# Patient Record
Sex: FEMALE | Race: WHITE | NOT HISPANIC OR LATINO | Employment: FULL TIME | URBAN - METROPOLITAN AREA
[De-identification: names, ages, dates, MRNs, and addresses within clinical notes are randomized per-mention and may not be internally consistent; named-entity substitution may affect disease eponyms.]

---

## 2017-05-17 ENCOUNTER — ALLSCRIPTS OFFICE VISIT (OUTPATIENT)
Dept: OTHER | Facility: OTHER | Age: 57
End: 2017-05-17

## 2017-06-12 ENCOUNTER — GENERIC CONVERSION - ENCOUNTER (OUTPATIENT)
Dept: OTHER | Facility: OTHER | Age: 57
End: 2017-06-12

## 2017-06-12 LAB
BASOPHILS # BLD AUTO: 0 X10E3/UL (ref 0–0.2)
BASOPHILS # BLD AUTO: 1 %
DEPRECATED RDW RBC AUTO: 13.8 % (ref 12.3–15.4)
EOSINOPHIL # BLD AUTO: 0.3 X10E3/UL (ref 0–0.4)
EOSINOPHIL # BLD AUTO: 6 %
HCT VFR BLD AUTO: 41.7 % (ref 34–46.6)
HGB BLD-MCNC: 14.1 G/DL (ref 11.1–15.9)
IMM.GRANULOCYTES (CD4/8) (HISTORICAL): 0 %
IMM.GRANULOCYTES (CD4/8) (HISTORICAL): 0 X10E3/UL (ref 0–0.1)
LYMPHOCYTES # BLD AUTO: 2 X10E3/UL (ref 0.7–3.1)
LYMPHOCYTES # BLD AUTO: 37 %
MCH RBC QN AUTO: 29.8 PG (ref 26.6–33)
MCHC RBC AUTO-ENTMCNC: 33.8 G/DL (ref 31.5–35.7)
MCV RBC AUTO: 88 FL (ref 79–97)
MONOCYTES # BLD AUTO: 0.5 X10E3/UL (ref 0.1–0.9)
MONOCYTES (HISTORICAL): 8 %
NEUTROPHILS # BLD AUTO: 2.7 X10E3/UL (ref 1.4–7)
NEUTROPHILS # BLD AUTO: 48 %
PLATELET # BLD AUTO: 340 X10E3/UL (ref 150–379)
RBC (HISTORICAL): 4.73 X10E6/UL (ref 3.77–5.28)
WBC # BLD AUTO: 5.5 X10E3/UL (ref 3.4–10.8)

## 2017-06-13 ENCOUNTER — GENERIC CONVERSION - ENCOUNTER (OUTPATIENT)
Dept: OTHER | Facility: OTHER | Age: 57
End: 2017-06-13

## 2017-06-13 LAB
A/G RATIO (HISTORICAL): 1.8 (ref 1.2–2.2)
ALBUMIN SERPL BCP-MCNC: 4.6 G/DL (ref 3.5–5.5)
ALP SERPL-CCNC: 109 IU/L (ref 39–117)
ALT SERPL W P-5'-P-CCNC: 14 IU/L (ref 0–32)
AST SERPL W P-5'-P-CCNC: 22 IU/L (ref 0–40)
BILIRUB SERPL-MCNC: <0.2 MG/DL (ref 0–1.2)
BUN SERPL-MCNC: 12 MG/DL (ref 6–24)
BUN/CREA RATIO (HISTORICAL): 17 (ref 9–23)
CALCIUM SERPL-MCNC: 9.3 MG/DL (ref 8.7–10.2)
CHLORIDE SERPL-SCNC: 102 MMOL/L (ref 96–106)
CHOLEST SERPL-MCNC: 224 MG/DL (ref 100–199)
CO2 SERPL-SCNC: 22 MMOL/L (ref 18–29)
CREAT SERPL-MCNC: 0.72 MG/DL (ref 0.57–1)
EGFR AFRICAN AMERICAN (HISTORICAL): 109 ML/MIN/1.73
EGFR-AMERICAN CALC (HISTORICAL): 95 ML/MIN/1.73
GLUCOSE SERPL-MCNC: 102 MG/DL (ref 65–99)
HDLC SERPL-MCNC: 95 MG/DL
INTERPRETATION (HISTORICAL): NORMAL
LDLC SERPL CALC-MCNC: 120 MG/DL (ref 0–99)
LYME IGG/IGM AB (HISTORICAL): <0.91 ISR (ref 0–0.9)
LYME IGM (HISTORICAL): <0.8 INDEX (ref 0–0.79)
POTASSIUM SERPL-SCNC: 4.4 MMOL/L (ref 3.5–5.2)
SODIUM SERPL-SCNC: 143 MMOL/L (ref 134–144)
TOT. GLOBULIN, SERUM (HISTORICAL): 2.5 G/DL (ref 1.5–4.5)
TOTAL PROTEIN (HISTORICAL): 7.1 G/DL (ref 6–8.5)
TRIGL SERPL-MCNC: 47 MG/DL (ref 0–149)

## 2017-06-14 ENCOUNTER — GENERIC CONVERSION - ENCOUNTER (OUTPATIENT)
Dept: OTHER | Facility: OTHER | Age: 57
End: 2017-06-14

## 2017-06-27 ENCOUNTER — GENERIC CONVERSION - ENCOUNTER (OUTPATIENT)
Dept: OTHER | Facility: OTHER | Age: 57
End: 2017-06-27

## 2017-07-12 ENCOUNTER — TRANSCRIBE ORDERS (OUTPATIENT)
Dept: ADMINISTRATIVE | Facility: HOSPITAL | Age: 57
End: 2017-07-12

## 2017-07-12 DIAGNOSIS — Z12.31 VISIT FOR SCREENING MAMMOGRAM: Primary | ICD-10-CM

## 2017-07-18 ENCOUNTER — ANESTHESIA EVENT (OUTPATIENT)
Dept: GASTROENTEROLOGY | Facility: AMBULARY SURGERY CENTER | Age: 57
End: 2017-07-18
Payer: COMMERCIAL

## 2017-07-18 RX ORDER — NICOTINE POLACRILEX 2 MG
GUM BUCCAL
COMMUNITY

## 2017-07-18 RX ORDER — IBUPROFEN 200 MG
TABLET ORAL EVERY 6 HOURS PRN
COMMUNITY

## 2017-07-19 ENCOUNTER — HOSPITAL ENCOUNTER (OUTPATIENT)
Facility: AMBULARY SURGERY CENTER | Age: 57
Setting detail: OUTPATIENT SURGERY
Discharge: HOME | End: 2017-07-19
Attending: INTERNAL MEDICINE | Admitting: INTERNAL MEDICINE
Payer: COMMERCIAL

## 2017-07-19 ENCOUNTER — ANESTHESIA (OUTPATIENT)
Dept: GASTROENTEROLOGY | Facility: AMBULARY SURGERY CENTER | Age: 57
End: 2017-07-19
Payer: COMMERCIAL

## 2017-07-19 ENCOUNTER — GENERIC CONVERSION - ENCOUNTER (OUTPATIENT)
Dept: OTHER | Facility: OTHER | Age: 57
End: 2017-07-19

## 2017-07-19 VITALS
RESPIRATION RATE: 16 BRPM | BODY MASS INDEX: 18.95 KG/M2 | OXYGEN SATURATION: 100 % | WEIGHT: 103 LBS | HEART RATE: 60 BPM | SYSTOLIC BLOOD PRESSURE: 107 MMHG | DIASTOLIC BLOOD PRESSURE: 66 MMHG | HEIGHT: 62 IN | TEMPERATURE: 97.7 F

## 2017-07-19 RX ORDER — PROPOFOL 10 MG/ML
INJECTION, EMULSION INTRAVENOUS AS NEEDED
Status: DISCONTINUED | OUTPATIENT
Start: 2017-07-19 | End: 2017-07-19 | Stop reason: SURG

## 2017-07-19 RX ORDER — SODIUM CHLORIDE, SODIUM LACTATE, POTASSIUM CHLORIDE, CALCIUM CHLORIDE 600; 310; 30; 20 MG/100ML; MG/100ML; MG/100ML; MG/100ML
125 INJECTION, SOLUTION INTRAVENOUS CONTINUOUS
Status: DISCONTINUED | OUTPATIENT
Start: 2017-07-19 | End: 2017-07-19 | Stop reason: HOSPADM

## 2017-07-19 RX ADMIN — PROPOFOL 50 MG: 10 INJECTION, EMULSION INTRAVENOUS at 08:14

## 2017-07-19 RX ADMIN — PROPOFOL 20 MG: 10 INJECTION, EMULSION INTRAVENOUS at 08:25

## 2017-07-19 RX ADMIN — LIDOCAINE HYDROCHLORIDE 40 MG: 20 INJECTION, SOLUTION INTRAVENOUS at 08:14

## 2017-07-19 RX ADMIN — PROPOFOL 20 MG: 10 INJECTION, EMULSION INTRAVENOUS at 08:20

## 2017-07-19 RX ADMIN — SODIUM CHLORIDE, SODIUM LACTATE, POTASSIUM CHLORIDE, AND CALCIUM CHLORIDE 125 ML/HR: .6; .31; .03; .02 INJECTION, SOLUTION INTRAVENOUS at 08:02

## 2017-07-19 RX ADMIN — PROPOFOL 20 MG: 10 INJECTION, EMULSION INTRAVENOUS at 08:17

## 2017-08-02 ENCOUNTER — HOSPITAL ENCOUNTER (OUTPATIENT)
Dept: RADIOLOGY | Facility: HOSPITAL | Age: 57
Discharge: HOME | End: 2017-08-02
Payer: COMMERCIAL

## 2017-08-02 DIAGNOSIS — Z12.31 VISIT FOR SCREENING MAMMOGRAM: ICD-10-CM

## 2017-08-02 PROCEDURE — G0202 SCR MAMMO BI INCL CAD: HCPCS

## 2018-01-09 NOTE — RESULT NOTES
Verified Results  (1) COMPREHENSIVE METABOLIC PANEL [id number] 66:05VE Claros Kale     Test Name Result Flag Reference   Glucose, Serum 102 mg/dL H 65-99   BUN 12 mg/dL  6-24   Creatinine, Serum 0 72 mg/dL  0 57-1 00   BUN/Creatinine Ratio 17  9-23   Sodium, Serum 143 mmol/L  134-144   Potassium, Serum 4 4 mmol/L  3 5-5 2   Chloride, Serum 102 mmol/L     Carbon Dioxide, Total 22 mmol/L  18-29   Calcium, Serum 9 3 mg/dL  8 7-10 2   Protein, Total, Serum 7 1 g/dL  6 0-8 5   Albumin, Serum 4 6 g/dL  3 5-5 5   Globulin, Total 2 5 g/dL  1 5-4 5   A/G Ratio 1 8  1 2-2 2   Bilirubin, Total <0 2 mg/dL  0 0-1 2   Alkaline Phosphatase, S 109 IU/L     AST (SGOT) 22 IU/L  0-40   ALT (SGPT) 14 IU/L  0-32   eGFR If NonAfricn Am 95 mL/min/1 73  >59   eGFR If Africn Am 109 mL/min/1 73  >59     (1) LIPID PANEL FASTING W DIRECT LDL REFLEX [id number] 08:20AM Claros Kale     Test Name Result Flag Reference   Cholesterol, Total 224 mg/dL H 100-199   Triglycerides 47 mg/dL  0-149   HDL Cholesterol 95 mg/dL  >39   LDL Cholesterol Calc 120 mg/dL H 0-99     (1) CBC/PLT/DIFF [id number] 08:20AM Claros Kale     Test Name Result Flag Reference   WBC 5 5 x10E3/uL  3 4-10 8   RBC 4 73 x10E6/uL  3 77-5 28   Hemoglobin 14 1 g/dL  11 1-15 9   Hematocrit 41 7 %  34 0-46  6   MCV 88 fL  79-97   MCH 29 8 pg  26 6-33 0   MCHC 33 8 g/dL  31 5-35 7   RDW 13 8 %  12 3-15 4   Platelets 016 D15V1/XR  150-379   Neutrophils 48 %     Lymphs 37 %     Monocytes 8 %     Eos 6 %     Basos 1 %     Neutrophils (Absolute) 2 7 x10E3/uL  1 4-7 0   Lymphs (Absolute) 2 0 x10E3/uL  0 7-3 1   Monocytes(Absolute) 0 5 x10E3/uL  0 1-0 9   Eos (Absolute) 0 3 x10E3/uL  0 0-0 4   Baso (Absolute) 0 0 x10E3/uL  0 0-0 2   Immature Granulocytes 0 %     Immature Grans (Abs) 0 0 x10E3/uL  0 0-0 1     (LC) Cardiovascular Risk Assessment [id number] 08:20AM Hyacinth Vargas     Test Name Result Flag Reference   Interpretation Note     Supplement report is available  PDF Image

## 2018-01-10 ENCOUNTER — GENERIC CONVERSION - ENCOUNTER (OUTPATIENT)
Dept: OTHER | Facility: OTHER | Age: 58
End: 2018-01-10

## 2018-01-11 NOTE — RESULT NOTES
Discussion/Summary   lyme titre negative     Verified Results  (LC) Lyme Ab/Western Blot Reflex [id number] 08:20AM Bib Spearman     Test Name Result Flag Reference   Lyme IgG/IgM Ab <0 91 ISR  0 00-0 90   Negative         <0 91                                                 Equivocal  0 91 - 1 09                                                 Positive         >1 09   Lyme Disease Ab, Quant, IgM <0 80 index  0 00-0 79   Negative         <0 80                                                 Equivocal  0 80 - 1 19                                                 Positive         >1 19                  IgM levels may peak at 3-6 weeks post infection, then                  gradually decline

## 2018-01-11 NOTE — PROGRESS NOTES
Assessment    1  Encounter for preventive health examination (V70 0) (Z00 00)   2  Never a smoker   3  Menopause (627 2) (Z78 0)   4  Colon cancer screening (V76 51) (Z12 11)    Plan  Colon cancer screening    · COLONOSCOPY; Status:Active; Requested for:27Apr2016;    · 1 - Joe Bowman MD (Gastroenterology) Physician Referral  Consult  Status: Active   Requested for: 27Apr2016  Care Summary provided  : Yes  Menopause, Screening for cardiovascular condition, Screening for deficiency anemia,  Screening for diabetes mellitus, Screening for hyperlipidemia, Screening for thyroid  disorder    · (1) COMPREHENSIVE METABOLIC PANEL; Status:Active; Requested for:27Apr2016;    · (1) FSH; Status:Active; Requested for:27Apr2016;    · (1) LH (LEUTINIZING HORMONE); Status:Active; Requested for:27Apr2016;    · (1) LIPID PANEL FASTING W DIRECT LDL REFLEX; Status:Active; Requested  for:27Apr2016;    · (1) TSH; Status:Active; Requested for:27Apr2016;   Menopause, Screening for cardiovascular condition, Screening for deficiency anemia,  Screening for diabetes mellitus, Screening for hyperlipidemia, Screening for thyroid  disorder, Tubular adenoma of colon    · (1) CBC/PLT/DIFF; Status:Active; Requested for:27Apr2016;     Discussion/Summary  health maintenance visit Currently, she eats a healthy diet  the risks and benefits of cervical cancer screening were discussed she is going to follow up with her gynecologist for her follow up pap smear  Breast cancer screening: had mammogram a year ago, will get order from Dr Yoan Mendosa  Colorectal cancer screening: colonoscopy has been ordered  Chief Complaint  CPE rmklpn      History of Present Illness  HM, Adult Female: The patient is being seen for a health maintenance evaluation  General Health: The patient's health since the last visit is described as good  Lifestyle:  She consumes a diverse and healthy diet  She does not have any weight concerns  She exercises regularly   She does not use tobacco    Reproductive health: the patient is postmenopausal   It has been 13 months since her last periods  Screening:   HPI: She is going to see her gynecologist for her pap smear  Review of Systems    Constitutional: No fever, no chills, feels well, no tiredness, no recent weight gain or weight loss  ENT: no complaints of earache, no loss of hearing, no nose bleeds, no nasal discharge, no sore throat, no hoarseness  Cardiovascular: No complaints of slow heart rate, no fast heart rate, no chest pain, no palpitations, no leg claudication, no lower extremity edema  Respiratory: No complaints of shortness of breath, no wheezing, no cough, no SOB on exertion, no orthopnea, no PND  Gastrointestinal: No complaints of abdominal pain, no constipation, no nausea or vomiting, no diarrhea, no bloody stools  Musculoskeletal: No complaints of arthralgias, no myalgias, no joint swelling or stiffness, no limb pain or swelling  Neurological: No complaints of headache, no confusion, no convulsions, no numbness, no dizziness or fainting, no tingling, no limb weakness, no difficulty walking  Active Problems    1  Arthritis (716 90) (M19 90)   2  Colon cancer screening (V76 51) (Z12 11)   3  Diverticulosis of sigmoid colon (562 10) (K57 30)   4  Encounter for routine gynecological examination (V72 31) (Z01 419)   5  Encounter for screening mammogram for malignant neoplasm of breast (V76 12)   (Z12 31)   6  Hearing loss (389 9) (H91 90)   7  Tubular adenoma of colon (211 3) (D12 6)   8   Well adult on routine health check (V70 0) (Z00 00)    Past Medical History    · History of Cough (786 2) (R05)   · History of acute sinusitis (V12 69) (Z87 09)   · History of fatigue (V13 89) (Q21 434)   · History of impacted cerumen (V12 49) (Z86 69)   · History of Lateral epicondylitis, unspecified laterality (726 32) (M77 10)   · History of Medial epicondylitis of elbow, unspecified laterality (726 31) (M77 00)   · Need for shingles vaccine (V04 89) (Z23)   · Personal history of urinary tract infection (V13 02) (Z87 440)   · History of Stye, left (373 11) (H00 016)    Family History  Family History    · Family history of Cancer    Social History    · Alcohol Use (History)   · Daily Tea Consumption (___ Cups/Day)   · Hearing loss (389 9) (H91 90)   · Never a smoker    Current Meds   1  No Reported Medications Recorded    Allergies    1  No Known Drug Allergies    Vitals   Recorded: 27Apr2016 11:41AM   Temperature 98 2 F   Heart Rate 68   Respiration 18   Systolic 032   Diastolic 60   Height 5 ft 2 in   Weight 105 lb    BMI Calculated 19 2   BSA Calculated 1 46     Physical Exam    Constitutional   General appearance: No acute distress, well appearing and well nourished  Ears, Nose, Mouth, and Throat   External inspection of ears and nose: Normal     Otoscopic examination: Tympanic membranes translucent with normal light reflex  Canals patent without erythema  Oropharynx: Normal with no erythema, edema, exudate or lesions  Pulmonary   Auscultation of lungs: Clear to auscultation  Cardiovascular   Auscultation of heart: Normal rate and rhythm, normal S1 and S2, no murmurs  Abdomen   Abdomen: Non-tender, no masses  Liver and spleen: No hepatomegaly or splenomegaly  Lymphatic   Palpation of lymph nodes in neck: No lymphadenopathy  Musculoskeletal   Gait and station: Normal     Skin   Skin and subcutaneous tissue: Normal without rashes or lesions  Neurologic   Reflexes: 2+ and symmetric  Psychiatric   Mood and affect: Normal        Procedure    Procedure: Visual Acuity Test    Indication: routine screening  Inforrmation supplied by a Snellen chart  Results: 20/30 in both eyes with corrective device, 20/40 in the right eye with corrective device, 20/30 in the left eye with corrective device      Signatures   Electronically signed by : Tacos Song DO;  Apr 27 2016 12:11PM EST (Author)

## 2018-01-13 NOTE — RESULT NOTES
Verified Results  [facility]) Cardiovascular Risk Assessment [id number] 09:00AM Clay Waller     Test Name Result Flag Reference   Interpretation Note     Supplement report is available  PDF Image

## 2018-01-15 NOTE — PROGRESS NOTES
Assessment    1  Encounter for preventive health examination (V70 0) (Z00 00)   2  Colon cancer screening (V76 51) (Z12 11)   3  Never a smoker   4  Need for shingles vaccine (V04 89) (Z23)   5  Screening for cardiovascular condition (V81 2) (Z13 6)   6  Headache (784 0) (R51)    Plan  Headache    · * MRI BRAIN WO CONTRAST; Status:Need Information - Financial Authorization; Requested for:17May2017; Headache, Screening for cardiovascular condition    · (1) CBC/PLT/DIFF; Status:Active; Requested for:17May2017;    · (1) COMPREHENSIVE METABOLIC PANEL; Status:Active; Requested for:17May2017;    · (1) LIPID PANEL FASTING W DIRECT LDL REFLEX; Status:Active; Requested  for:17May2017;    · (1) LYME ANTIBODY PROFILE W/REFLEX TO WESTERN BLOT; Status:Active; Requested for:17May2017;   Need for shingles vaccine, SocHx: Never a smoker    · Zostavax 74062 UNT/0 65ML Subcutaneous Solution Reconstituted (Zostavax  68000 UNT/0 65ML Subcutaneous Solution Reconstituted); INJECT 0 65 ML Once    Discussion/Summary  health maintenance visit Currently, she eats a healthy diet and has an adequate exercise regimen  cervical cancer screening is current She sees her gynecologist regularly Breast cancer screening: mammogram is current  Colorectal cancer screening: the risks and benefits of colorectal cancer screening were discussed and she is going to schedule her colonoscopy this summer  Osteoporosis screening: bone mineral density testing is not indicated  The immunizations are up to date  New onset of frequent headaches for the past 2-3 months  Chief Complaint  CPE rmklpn      History of Present Illness  , Adult Female: The patient is being seen for a health maintenance evaluation  General Health: The patient's health since the last visit is described as good  Immunizations status: up to date  Lifestyle:  She consumes a diverse and healthy diet  She does not have any weight concerns  She exercises regularly   She does not use tobacco    Reproductive health: the patient is postmenopausal    Screening:   HPI: she has been waking up with headaches occasionally in the morning  It happens during the day as well  Review of Systems    Constitutional: No fever, no chills, feels well, no tiredness, no recent weight gain or weight loss  ENT: no complaints of earache, no loss of hearing, no nose bleeds, no nasal discharge, no sore throat, no hoarseness  Cardiovascular: No complaints of slow heart rate, no fast heart rate, no chest pain, no palpitations, no leg claudication, no lower extremity edema  Respiratory: No complaints of shortness of breath, no wheezing, no cough, no SOB on exertion, no orthopnea, no PND  Gastrointestinal: No complaints of abdominal pain, no constipation, no nausea or vomiting, no diarrhea, no bloody stools  Neurological: dull headaches  Active Problems    1  Colon cancer screening (V76 51) (Z12 11)   2  Diverticulosis of sigmoid colon (562 10) (K57 30)   3  Encounter for routine gynecological examination (V72 31) (Z01 419)   4  Encounter for screening mammogram for malignant neoplasm of breast (V76 12)   (Z12 31)   5  Menopause (627 2) (Z78 0)   6  Screening for cardiovascular condition (V81 2) (Z13 6)   7  Screening for deficiency anemia (V78 1) (Z13 0)   8  Screening for diabetes mellitus (V77 1) (Z13 1)   9  Screening for hyperlipidemia (V77 91) (Z13 220)   10  Screening for thyroid disorder (V77 0) (Z13 29)   11  Tubular adenoma of colon (211 3) (D12 6)   12   Well adult on routine health check (V70 0) (Z00 00)    Past Medical History    · History of Cough (786 2) (R05)   · History of acute sinusitis (V12 69) (Z87 09)   · History of arthritis (V13 4) (Z87 39)   · History of fatigue (V13 89) (U35 127)   · History of hearing loss (V12 49) (Z86 69)   · History of impacted cerumen (V12 49) (Z86 69)   · History of Lateral epicondylitis, unspecified laterality (726 32) (M77 10)   · History of Medial epicondylitis of elbow, unspecified laterality (726 31) (M77 00)   · Personal history of urinary tract infection (V13 02) (Z87 440)   · History of Stye, left (373 11) (H00 016)    Surgical History    · History of  Section    Family History  Mother    · No pertinent family history  Father    · No pertinent family history  Family History    · Denied: Family history of Cancer    Social History    · Alcohol Use (History)   · Daily Tea Consumption (___ Cups/Day)   · Never a smoker    Current Meds   1  No Reported Medications Recorded    Allergies    1  No Known Drug Allergies    Vitals   Recorded: 68CTH6978 02:51PM   Temperature 97 4 F   Heart Rate 78   Respiration 16   Systolic 662   Diastolic 70   Height 5 ft 2 in   Weight 103 lb    BMI Calculated 18 84   BSA Calculated 1 44     Physical Exam    Constitutional   General appearance: No acute distress, well appearing and well nourished  Ears, Nose, Mouth, and Throat   External inspection of ears and nose: Normal     Otoscopic examination: Tympanic membranes translucent with normal light reflex  Canals patent without erythema  Oropharynx: Normal with no erythema, edema, exudate or lesions  Neck   Neck: Supple, symmetric, trachea midline, no masses  Pulmonary   Auscultation of lungs: Clear to auscultation  Cardiovascular   Auscultation of heart: Normal rate and rhythm, normal S1 and S2, no murmurs  Abdomen   Abdomen: Non-tender, no masses  Liver and spleen: No hepatomegaly or splenomegaly  Lymphatic   Palpation of lymph nodes in neck: No lymphadenopathy  Musculoskeletal   Gait and station: Normal     Neurologic   Cortical function: Normal mental status  Reflexes: 2+ and symmetric      Psychiatric   Mood and affect: Normal        Results/Data  PHQ-2 Adult Depression Screening [id number] 02:50PM User, Rosas     Test Name Result Flag Reference   PHQ-2 Adult Depression Score 0     Over the last two weeks, how often have you been bothered by any of the following problems? Little interest or pleasure in doing things: Not at all - 0  Feeling down, depressed, or hopeless: Not at all - 0   PHQ-2 Adult Depression Screening Negative         Procedure    Procedure: Visual Acuity Test    Indication: routine screening  Inforrmation supplied by a Snellen chart     Results: 20/25 in both eyes with corrective device, 20/30 in the right eye with corrective device, 20/30 in the left eye with corrective device      Signatures   Electronically signed by : Kaylie Lo DO; May 17 2017  3:18PM EST                       (Author)

## 2018-01-15 NOTE — MISCELLANEOUS
Message   Recorded as Task   Date: 06/27/2017 03:09 PM, Created By: Car Garrett   Task Name: Intake   Assigned To: Car Garrett   Regarding Patient: Joseph Ardon, Status: Active   CommentTwsupa Pedroza - 27 Jun 2017 3:09 PM     TASK CREATED  Date: 5/19/2017  Screened by: Evelio Byrd    Referring Provider: DR GEORGE    Pre- Screening:   Has patient been referred for a routine screening Colonoscopy? Yes   Is the patient over 48years of age? Yes     If the answer is YES to both questions, proceed to the medical questions  Do you have a family history of colon cancer? No    Do you have a personal history of colon polyps? No    Do you have any of the following symptoms? NO    Have you had a coronary or vascular stent within the last year? No    Have you had a heart attack or stroke in the last 6 months? No    Have you had intestinal surgery in the last 3 months? No    Do you have problems with:   NO    Do you use:  NO    Have you been hospitalized in the last Month? No    Have you been diagnosed with a bleeding disorder or anemia? No    Have you had chest pain (angina) or breathing problems  (COPD) in the last 3 months? No    Do you have any difficulty walking up a flight of stairs? No    Have you had Kidney failure or insufficiency? No    Have you had heart valve surgery? No    Are you confined to a wheelchair? No     Do you take,,,,, or other blood thinning medication? No    Have you been diagnosed with Diabetes or are you taking any   Diabetic medications? No    : If patient answers NO to medical questions, then schedule procedure  If patient answers YES to medical questions, then schedule office appointment  Previous Colonoscopy ( if yes)  No  Date and Facility of last colonoscopy? [   ]    Patient scheduled for procedure: YES  Scheduled by: Evelio Byrd  Date: 7/19/17  Time: [  ]  Provider: Charline Oden  Location: [facility]    Referral Obtained for procedure:     Were instructions Mailed? Yes   Was the prep sent to Pharmacy? Yes     Comments: [  ]        Active Problems    1  Colon cancer screening (V76 51) (Z12 11)   2  Diverticulosis of sigmoid colon (562 10) (K57 30)   3  Encounter for routine gynecological examination (V72 31) (Z01 419)   4  Encounter for screening mammogram for malignant neoplasm of breast (V76 12)   (Z12 31)   5  Headache (784 0) (R51)   6  Menopause (627 2) (Z78 0)   7  Need for shingles vaccine (V04 89) (Z23)   8  Screening for cardiovascular condition (V81 2) (Z13 6)   9  Screening for deficiency anemia (V78 1) (Z13 0)   10  Screening for diabetes mellitus (V77 1) (Z13 1)   11  Screening for hyperlipidemia (V77 91) (Z13 220)   12  Screening for thyroid disorder (V77 0) (Z13 29)   13  Tubular adenoma of colon (211 3) (D12 6)   14  Well adult on routine health check (V70 0) (Z00 00)    Current Meds   1  Suprep Bowel Prep Kit 17 5-3 13-1 6 GM/180ML Oral Solution; USE AS DIRECTED; Therapy: [id number] to (Last Rx:[id number])  Requested for: [id number] Ordered    Allergies    1   No Known Drug Allergies    Signatures   Electronically signed by : Carlos Valdes, ; Jun 27 2017  3:10PM EST                       (Author)

## 2018-01-15 NOTE — RESULT NOTES
Verified Results  (1) COMPREHENSIVE METABOLIC PANEL [id number] 61:34[facility]     Test Name Result Flag Reference   Glucose, Serum 94 mg/dL  65-99   BUN 11 mg/dL  6-24   Creatinine, Serum 0 56 mg/dL L 0 57-1 00   eGFR If NonAfricn Am 106 mL/min/1 73  >59   eGFR If Africn Am 122 mL/min/1 73  >59   BUN/Creatinine Ratio 20  9-23   Sodium, Serum 142 mmol/L  134-144   Potassium, Serum 4 0 mmol/L  3 5-5 2   Chloride, Serum 100 mmol/L     Carbon Dioxide, Total 22 mmol/L  18-29   Calcium, Serum 9 6 mg/dL  8 7-10 2   Protein, Total, Serum 7 0 g/dL  6 0-8 5   Albumin, Serum 4 4 g/dL  3 5-5 5   Globulin, Total 2 6 g/dL  1 5-4 5   A/G Ratio 1 7  1 1-2 5   Bilirubin, Total <0 2 mg/dL  0 0-1 2   Alkaline Phosphatase, S 119 IU/L H    AST (SGOT) 17 IU/L  0-40   ALT (SGPT) 12 IU/L  0-32   Glucose, Serum 94 mg/dL  65-99   BUN 11 mg/dL  6-24   Creatinine, Serum 0 56 mg/dL L 0 57-1 00   eGFR If NonAfricn Am 106 mL/min/1 73  >59   eGFR If Africn Am 122 mL/min/1 73  >59   BUN/Creatinine Ratio 20  9-23   Sodium, Serum 142 mmol/L  134-144   Potassium, Serum 4 0 mmol/L  3 5-5 2   Chloride, Serum 100 mmol/L     Carbon Dioxide, Total 22 mmol/L  18-29   Calcium, Serum 9 6 mg/dL  8 7-10 2   Protein, Total, Serum 7 0 g/dL  6 0-8 5   Albumin, Serum 4 4 g/dL  3 5-5 5   Globulin, Total 2 6 g/dL  1 5-4 5   A/G Ratio 1 7  1 1-2 5   Bilirubin, Total <0 2 mg/dL  0 0-1 2   Alkaline Phosphatase, S 119 IU/L H    AST (SGOT) 17 IU/L  0-40   ALT (SGPT) 12 IU/L  0-32           (1) LIPID PANEL FASTING W DIRECT LDL REFLEX [id number] 09:00AM Brook Waller Cea     Test Name Result Flag Reference   Cholesterol, Total 190 mg/dL  100-199   Triglycerides 66 mg/dL  0-149   HDL Cholesterol 77 mg/dL  >39   According to ATP-III Guidelines, HDL-C >59 mg/dL is considered a  negative risk factor for CHD     LDL Cholesterol Calc 100 mg/dL H 0-99   Cholesterol, Total 190 mg/dL  100-199   Triglycerides 66 mg/dL  0-149   HDL Cholesterol 77 mg/dL  >39   According to ATP-III Guidelines, HDL-C >59 mg/dL is considered a  negative risk factor for CHD     LDL Cholesterol Calc 100 mg/dL H 0-99

## 2018-01-15 NOTE — RESULT NOTES
Verified Results  (1) CBC/PLT/DIFF [id number] 09:00AM Tono Waller     Test Name Result Flag Reference   WBC 4 9 x10E3/uL  3 4-10 8   RBC 4 56 x10E6/uL  3 77-5 28   Hemoglobin 13 4 g/dL  11 1-15 9   Hematocrit 39 9 %  34 0-46  6   MCV 88 fL  79-97   MCH 29 4 pg  26 6-33 0   MCHC 33 6 g/dL  31 5-35 7   RDW 14 0 %  12 3-15 4   Platelets 145 V45I9/PZ  150-379   Neutrophils 55 %     Lymphs 34 %     Monocytes 7 %     Eos 4 %     Basos 0 %     Neutrophils (Absolute) 2 7 x10E3/uL  1 4-7 0   Lymphs (Absolute) 1 7 x10E3/uL  0 7-3 1   Monocytes(Absolute) 0 4 x10E3/uL  0 1-0 9   Eos (Absolute) 0 2 x10E3/uL  0 0-0 4   Baso (Absolute) 0 0 x10E3/uL  0 0-0 2   Immature Granulocytes 0 %     Immature Grans (Abs) 0 0 x10E3/uL  0 0-0 1   WBC 4 9 x10E3/uL  3 4-10 8   RBC 4 56 x10E6/uL  3 77-5 28   Hemoglobin 13 4 g/dL  11 1-15 9   Hematocrit 39 9 %  34 0-46  6   MCV 88 fL  79-97   MCH 29 4 pg  26 6-33 0   MCHC 33 6 g/dL  31 5-35 7   RDW 14 0 %  12 3-15 4   Platelets 026 A53U9/VO  150-379   Neutrophils 55 %     Lymphs 34 %     Monocytes 7 %     Eos 4 %     Basos 0 %     Neutrophils (Absolute) 2 7 x10E3/uL  1 4-7 0   Lymphs (Absolute) 1 7 x10E3/uL  0 7-3 1   Monocytes(Absolute) 0 4 x10E3/uL  0 1-0 9   Eos (Absolute) 0 2 x10E3/uL  0 0-0 4   Baso (Absolute) 0 0 x10E3/uL  0 0-0 2   Immature Granulocytes 0 %     Immature Grans (Abs) 0 0 x10E3/uL  0 0-0 1           (1) TSH [id number] 09:00AM Ru Waller     Test Name Result Flag Reference   TSH 1 810 uIU/mL  0 450-4 500                 (1) [facility] [id number] 09:00AM Tono Waller     Test Name Result Flag Reference   FSH 45 4 mIU/mL     Follicular phase        3 5 -  12 5                                     Ovulation phase         4 7 -  21 5                                     Luteal phase            1 7 -   7 7                                     Postmenopausal         25 8 - 134 8                       (1) LH (LEUTINIZING HORMONE) [id number] 09:00AM Tono Waller     Test Name Result Flag Reference   LH 10 0 mIU/mL     Follicular phase        2 4 -  12 6                                     Ovulation phase        14 0 -  95 6                                     Luteal phase            1 0 -  11 4                                     Postmenopausal          7 7 -  58 5

## 2018-01-16 NOTE — MISCELLANEOUS
Message  Patient called and she is not due until 7/2017 for a colonoscopy  Dr Tiny Reyes agreed that she can wait until that time  If nothing has changed in her health she may have the colonoscopy without an office appointment  A reminder is set  Active Problems    1  Arthritis (716 90) (M19 90)   2  Colon cancer screening (V76 51) (Z12 11)   3  Diverticulosis of sigmoid colon (562 10) (K57 30)   4  Encounter for routine gynecological examination (V72 31) (Z01 419)   5  Encounter for screening mammogram for malignant neoplasm of breast (V76 12)   (Z12 31)   6  Hearing loss (389 9) (H91 90)   7  Menopause (627 2) (Z78 0)   8  Screening for cardiovascular condition (V81 2) (Z13 6)   9  Screening for deficiency anemia (V78 1) (Z13 0)   10  Screening for diabetes mellitus (V77 1) (Z13 1)   11  Screening for hyperlipidemia (V77 91) (Z13 220)   12  Screening for thyroid disorder (V77 0) (Z13 29)   13  Tubular adenoma of colon (211 3) (D12 6)   14  Well adult on routine health check (V70 0) (Z00 00)    Current Meds   1  Suprep Bowel Prep Oral Solution; USE AS DIRECTED; Therapy: [id number] to (Last Rx:[id number])  Requested for: [id number] Ordered    Allergies    1   No Known Drug Allergies    Signatures   Electronically signed by : Bianca Mohr, ; Jul 22 2016  3:51PM EST                       (Author)

## 2018-01-22 VITALS
DIASTOLIC BLOOD PRESSURE: 70 MMHG | TEMPERATURE: 97.4 F | WEIGHT: 103 LBS | RESPIRATION RATE: 16 BRPM | HEART RATE: 78 BPM | HEIGHT: 62 IN | BODY MASS INDEX: 18.95 KG/M2 | SYSTOLIC BLOOD PRESSURE: 104 MMHG

## 2018-01-24 VITALS
RESPIRATION RATE: 16 BRPM | WEIGHT: 107 LBS | BODY MASS INDEX: 19.69 KG/M2 | TEMPERATURE: 98.3 F | HEIGHT: 62 IN | DIASTOLIC BLOOD PRESSURE: 62 MMHG | HEART RATE: 66 BPM | SYSTOLIC BLOOD PRESSURE: 102 MMHG

## 2018-06-13 ENCOUNTER — APPOINTMENT (OUTPATIENT)
Dept: RADIOLOGY | Facility: CLINIC | Age: 58
End: 2018-06-13
Payer: COMMERCIAL

## 2018-06-13 ENCOUNTER — OFFICE VISIT (OUTPATIENT)
Dept: OBGYN CLINIC | Facility: CLINIC | Age: 58
End: 2018-06-13
Payer: COMMERCIAL

## 2018-06-13 VITALS
WEIGHT: 100.8 LBS | BODY MASS INDEX: 18.55 KG/M2 | SYSTOLIC BLOOD PRESSURE: 105 MMHG | HEART RATE: 83 BPM | HEIGHT: 62 IN | DIASTOLIC BLOOD PRESSURE: 73 MMHG

## 2018-06-13 DIAGNOSIS — M79.641 HAND PAIN, RIGHT: ICD-10-CM

## 2018-06-13 DIAGNOSIS — M79.641 HAND PAIN, RIGHT: Primary | ICD-10-CM

## 2018-06-13 PROCEDURE — 99203 OFFICE O/P NEW LOW 30 MIN: CPT | Performed by: ORTHOPAEDIC SURGERY

## 2018-06-13 PROCEDURE — 73130 X-RAY EXAM OF HAND: CPT

## 2018-06-13 NOTE — PROGRESS NOTES
Assessment/Plan:  1  Hand pain, right  XR hand 3+ vw right       Danie Pitts appears to have a sprain/ tendinitis  Fortunately this has already improved some  She does have a wrist brace which she can wear as needed for comfort  If she fails to make significant progress over the next couple months and we will see her back and  can consider an MRI at that point  Subjective:   Lavon Diaz is a 66 y o  female who presents today for evaluation of her right hand  She states that back on May 20th she was taking a golfing less than and struck the club on the ground quite hard injuring this hand  She states her symptoms up improved some since that time but is still bothering her  She notes most of her pain to be about the dorsal aspect of the hand in the region of the base of the 2nd metacarpal   This is worse with certain activities like pouring a cup of coffee for and pressure to this region  She has some discomfort with wrist extension as well  She notes good range of motion of the wrist and good strength  She denies any paresthesias of the hand  Review of Systems   Constitutional: Negative for chills, fever and unexpected weight change  HENT: Negative for hearing loss, nosebleeds and sore throat  Eyes: Negative for pain, redness and visual disturbance  Respiratory: Negative for cough, shortness of breath and wheezing  Cardiovascular: Negative for chest pain, palpitations and leg swelling  Gastrointestinal: Negative for abdominal pain, nausea and vomiting  Endocrine: Negative for polydipsia and polyuria  Genitourinary: Negative for dysuria and hematuria  Musculoskeletal:        See HPI   Skin: Negative for rash and wound  Neurological: Negative for dizziness, numbness and headaches  Psychiatric/Behavioral: Negative for decreased concentration and suicidal ideas  The patient is not nervous/anxious  History reviewed  No pertinent past medical history      Past Surgical History: Procedure Laterality Date     SECTION      transverse x1    COLONOSCOPY N/A 2017    Procedure: COLONOSCOPY;  Surgeon: Juan Ramírez MD;  Location: [place] [address] [facility]; Service: Gastroenterology       Family History   Problem Relation Age of Onset    No Known Problems Mother     No Known Problems Father     No Known Problems Sister     No Known Problems Brother     No Known Problems Daughter     No Known Problems Son        Social History     Occupational History    Not on file  Social History Main Topics    Smoking status: Former Smoker     Packs/day: 0 25     Quit date:     Smokeless tobacco: Never Used    Alcohol use Yes      Comment: socially    Drug use: No    Sexual activity: Not on file         Current Outpatient Prescriptions:     Biotin 1 MG CAPS, Take by mouth daily with breakfast, Disp: , Rfl:     Multiple Vitamins-Minerals (MULTIVITAMIN PO), Take by mouth, Disp: , Rfl:     Naproxen Sodium (ALEVE PO), Take by mouth, Disp: , Rfl:     ibuprofen (MOTRIN) 200 mg tablet, Take by mouth every 6 (six) hours as needed for mild pain, Disp: , Rfl:     Allergies   Allergen Reactions    Other Other (See Comments)     Adhesive tape    Latex Rash       Objective:  Vitals:    18 1342   BP: 105/73   Pulse: 83       Ortho Exam    Physical Exam   Constitutional: She is oriented to person, place, and time  She appears well-developed and well-nourished  No distress  HENT:   Head: Normocephalic and atraumatic  Eyes: Conjunctivae and EOM are normal  No scleral icterus  Neck: No JVD present  Cardiovascular: Normal rate and intact distal pulses  Pulmonary/Chest: Effort normal  No respiratory distress  Abdominal: She exhibits no distension  Neurological: She is alert and oriented to person, place, and time  Coordination normal    Skin: Skin is warm  Psychiatric: She has a normal mood and affect       Right hand:  Mild swelling dorsal hand overlying 2nd metacarpal   Mild tenderness over base of 2nd metacarpal in region of extensor carpi radialis  Nontender base of 1st metacarpal or thumb CMC joint  Full range of motion of all digits and wrist  5/5 strength wrist flexion and extension  Sensation intact median, radial, and ulnar nerve distributions of the hand  2+ radial pulse  Nontender over pisiform and hook of hamate      I have personally reviewed pertinent films in PACS and my interpretation is as follows:  X-rays right hand:  Normal

## 2018-07-26 ENCOUNTER — TRANSCRIBE ORDERS (OUTPATIENT)
Dept: ADMINISTRATIVE | Facility: HOSPITAL | Age: 58
End: 2018-07-26

## 2018-07-26 DIAGNOSIS — Z12.39 SCREENING BREAST EXAMINATION: Primary | ICD-10-CM

## 2018-08-15 ENCOUNTER — HOSPITAL ENCOUNTER (OUTPATIENT)
Dept: RADIOLOGY | Facility: HOSPITAL | Age: 58
Discharge: HOME | End: 2018-08-15
Payer: COMMERCIAL

## 2018-08-15 DIAGNOSIS — Z12.39 SCREENING BREAST EXAMINATION: ICD-10-CM

## 2018-08-15 PROCEDURE — 77067 SCR MAMMO BI INCL CAD: CPT

## 2018-12-21 ENCOUNTER — OFFICE VISIT (OUTPATIENT)
Dept: FAMILY MEDICINE CLINIC | Facility: CLINIC | Age: 58
End: 2018-12-21
Payer: COMMERCIAL

## 2018-12-21 VITALS
DIASTOLIC BLOOD PRESSURE: 74 MMHG | HEART RATE: 88 BPM | TEMPERATURE: 100.9 F | SYSTOLIC BLOOD PRESSURE: 124 MMHG | HEIGHT: 62 IN | WEIGHT: 102.2 LBS | BODY MASS INDEX: 18.81 KG/M2 | RESPIRATION RATE: 18 BRPM

## 2018-12-21 DIAGNOSIS — J11.1 INFLUENZA: Primary | ICD-10-CM

## 2018-12-21 PROCEDURE — 3008F BODY MASS INDEX DOCD: CPT | Performed by: FAMILY MEDICINE

## 2018-12-21 PROCEDURE — 1036F TOBACCO NON-USER: CPT | Performed by: FAMILY MEDICINE

## 2018-12-21 PROCEDURE — 99213 OFFICE O/P EST LOW 20 MIN: CPT | Performed by: FAMILY MEDICINE

## 2018-12-21 RX ORDER — CONJUGATED ESTROGENS 0.62 MG/G
CREAM VAGINAL
Refills: 2 | COMMUNITY
Start: 2018-10-09

## 2018-12-21 RX ORDER — OSELTAMIVIR PHOSPHATE 75 MG/1
75 CAPSULE ORAL EVERY 12 HOURS SCHEDULED
Qty: 10 CAPSULE | Refills: 0 | Status: SHIPPED | OUTPATIENT
Start: 2018-12-21 | End: 2018-12-26

## 2018-12-21 NOTE — PROGRESS NOTES
Assessment/Plan:    Problem List Items Addressed This Visit     None      Visit Diagnoses     Influenza    -  Primary    clinical influenza   supportive measures reviewed    Relevant Medications    oseltamivir (TAMIFLU) 75 mg capsule          There are no Patient Instructions on file for this visit  Return if symptoms worsen or fail to improve  Subjective:      Patient ID: Kellee Blair is a 66 y o  female  Chief Complaint   Patient presents with    Influenza     possibly  woke up this AM with nausea, no vomiting, no diarrhea, no aches    Sore Throat     12/9/18 while flying home     Cold Like Symptoms     after sore throat started     Headache       She had a head cold in the beginning of the month  Then it went into her chest   Then she wok up feeling very sick this morning  She has been nauseated  She started feeling feverish today  The following portions of the patient's history were reviewed and updated as appropriate:  past social history    Review of Systems   Constitutional: Positive for fever  Respiratory: Positive for cough  Gastrointestinal: Positive for nausea  Current Outpatient Prescriptions   Medication Sig Dispense Refill    Biotin 1 MG CAPS Take by mouth daily with breakfast      Multiple Vitamins-Minerals (MULTIVITAMIN PO) Take by mouth      Naproxen Sodium (ALEVE PO) Take by mouth      PREMARIN vaginal cream   2    ibuprofen (MOTRIN) 200 mg tablet Take by mouth every 6 (six) hours as needed for mild pain      oseltamivir (TAMIFLU) 75 mg capsule Take 1 capsule (75 mg total) by mouth every 12 (twelve) hours for 5 days 10 capsule 0     No current facility-administered medications for this visit  Objective:    /74   Pulse 88   Temp (!) 100 9 °F (38 3 °C)   Resp 18   Ht 5' 2" (1 575 m)   Wt 46 4 kg (102 lb 3 2 oz)   BMI 18 69 kg/m²        Physical Exam   Constitutional: She appears well-developed and well-nourished     HENT:   Head: Normocephalic and atraumatic  Right Ear: External ear normal    Left Ear: External ear normal    Nose: Nose normal    Mouth/Throat: Oropharynx is clear and moist    Cardiovascular: Normal rate, regular rhythm and normal heart sounds  Pulmonary/Chest: Effort normal and breath sounds normal  No respiratory distress  She has no wheezes  Nursing note and vitals reviewed               Mariana Justin DO

## 2019-03-31 PROBLEM — R51.9 HEADACHE: Status: ACTIVE | Noted: 2017-05-17

## 2019-04-03 ENCOUNTER — OFFICE VISIT (OUTPATIENT)
Dept: FAMILY MEDICINE CLINIC | Facility: CLINIC | Age: 59
End: 2019-04-03
Payer: COMMERCIAL

## 2019-04-03 VITALS
SYSTOLIC BLOOD PRESSURE: 116 MMHG | HEIGHT: 62 IN | WEIGHT: 104 LBS | RESPIRATION RATE: 18 BRPM | HEART RATE: 84 BPM | TEMPERATURE: 98.2 F | DIASTOLIC BLOOD PRESSURE: 70 MMHG | BODY MASS INDEX: 19.14 KG/M2

## 2019-04-03 DIAGNOSIS — Z13.6 SCREENING FOR CARDIOVASCULAR CONDITION: ICD-10-CM

## 2019-04-03 DIAGNOSIS — F51.01 PRIMARY INSOMNIA: ICD-10-CM

## 2019-04-03 DIAGNOSIS — Z00.00 ANNUAL PHYSICAL EXAM: Primary | ICD-10-CM

## 2019-04-03 DIAGNOSIS — Z11.59 NEED FOR HEPATITIS C SCREENING TEST: ICD-10-CM

## 2019-04-03 DIAGNOSIS — Z79.899 ENCOUNTER FOR LONG-TERM CURRENT USE OF MEDICATION: ICD-10-CM

## 2019-04-03 PROCEDURE — 99396 PREV VISIT EST AGE 40-64: CPT | Performed by: FAMILY MEDICINE

## 2019-04-03 PROCEDURE — 3725F SCREEN DEPRESSION PERFORMED: CPT | Performed by: FAMILY MEDICINE

## 2019-04-03 RX ORDER — ZOLPIDEM TARTRATE 5 MG/1
5 TABLET ORAL
Qty: 15 TABLET | Refills: 0 | Status: SHIPPED | OUTPATIENT
Start: 2019-04-03 | End: 2020-03-19 | Stop reason: SDUPTHER

## 2019-04-11 LAB
ALBUMIN SERPL-MCNC: 4.5 G/DL (ref 3.5–5.5)
ALBUMIN/GLOB SERPL: 1.8 {RATIO} (ref 1.2–2.2)
ALP SERPL-CCNC: 99 IU/L (ref 39–117)
ALT SERPL-CCNC: 12 IU/L (ref 0–32)
AST SERPL-CCNC: 19 IU/L (ref 0–40)
BILIRUB SERPL-MCNC: 0.3 MG/DL (ref 0–1.2)
BUN SERPL-MCNC: 11 MG/DL (ref 6–24)
BUN/CREAT SERPL: 16 (ref 9–23)
CALCIUM SERPL-MCNC: 9.5 MG/DL (ref 8.7–10.2)
CHLORIDE SERPL-SCNC: 101 MMOL/L (ref 96–106)
CHOLEST SERPL-MCNC: 197 MG/DL (ref 100–199)
CO2 SERPL-SCNC: 25 MMOL/L (ref 20–29)
CREAT SERPL-MCNC: 0.68 MG/DL (ref 0.57–1)
ERYTHROCYTE [DISTWIDTH] IN BLOOD BY AUTOMATED COUNT: 13.5 % (ref 12.3–15.4)
GLOBULIN SER-MCNC: 2.5 G/DL (ref 1.5–4.5)
GLUCOSE SERPL-MCNC: 97 MG/DL (ref 65–99)
HCT VFR BLD AUTO: 39.5 % (ref 34–46.6)
HCV AB S/CO SERPL IA: <0.1 S/CO RATIO (ref 0–0.9)
HDLC SERPL-MCNC: 83 MG/DL
HGB BLD-MCNC: 13.6 G/DL (ref 11.1–15.9)
LDLC SERPL CALC-MCNC: 96 MG/DL (ref 0–99)
LDLC/HDLC SERPL: 1.2 RATIO (ref 0–3.2)
MCH RBC QN AUTO: 29.9 PG (ref 26.6–33)
MCHC RBC AUTO-ENTMCNC: 34.4 G/DL (ref 31.5–35.7)
MCV RBC AUTO: 87 FL (ref 79–97)
MICRODELETION SYND BLD/T FISH: NORMAL
PLATELET # BLD AUTO: 330 X10E3/UL (ref 150–379)
POTASSIUM SERPL-SCNC: 4.8 MMOL/L (ref 3.5–5.2)
PROT SERPL-MCNC: 7 G/DL (ref 6–8.5)
RBC # BLD AUTO: 4.55 X10E6/UL (ref 3.77–5.28)
SL AMB EGFR AFRICAN AMERICAN: 112 ML/MIN/1.73
SL AMB EGFR NON AFRICAN AMERICAN: 97 ML/MIN/1.73
SL AMB VLDL CHOLESTEROL CALC: 18 MG/DL (ref 5–40)
SODIUM SERPL-SCNC: 140 MMOL/L (ref 134–144)
TRIGL SERPL-MCNC: 90 MG/DL (ref 0–149)
WBC # BLD AUTO: 5.2 X10E3/UL (ref 3.4–10.8)

## 2019-07-24 ENCOUNTER — TRANSCRIBE ORDERS (OUTPATIENT)
Dept: ADMINISTRATIVE | Facility: HOSPITAL | Age: 59
End: 2019-07-24

## 2019-07-24 DIAGNOSIS — Z12.39 BREAST SCREENING, UNSPECIFIED: Primary | ICD-10-CM

## 2019-08-16 ENCOUNTER — HOSPITAL ENCOUNTER (OUTPATIENT)
Dept: RADIOLOGY | Facility: HOSPITAL | Age: 59
Discharge: HOME | End: 2019-08-16
Payer: COMMERCIAL

## 2019-08-16 VITALS — HEIGHT: 62 IN | BODY MASS INDEX: 19.14 KG/M2 | WEIGHT: 104 LBS

## 2019-08-16 DIAGNOSIS — Z12.39 BREAST SCREENING, UNSPECIFIED: ICD-10-CM

## 2019-08-16 PROCEDURE — 77067 SCR MAMMO BI INCL CAD: CPT

## 2020-03-19 DIAGNOSIS — F51.01 PRIMARY INSOMNIA: ICD-10-CM

## 2020-03-19 RX ORDER — ZOLPIDEM TARTRATE 5 MG/1
5 TABLET ORAL
Qty: 15 TABLET | Refills: 0 | Status: SHIPPED | OUTPATIENT
Start: 2020-03-19

## 2020-08-03 ENCOUNTER — TELEMEDICINE (OUTPATIENT)
Dept: FAMILY MEDICINE CLINIC | Facility: CLINIC | Age: 60
End: 2020-08-03
Payer: COMMERCIAL

## 2020-08-03 DIAGNOSIS — R21 RASH: Primary | ICD-10-CM

## 2020-08-03 PROCEDURE — 99213 OFFICE O/P EST LOW 20 MIN: CPT | Performed by: NURSE PRACTITIONER

## 2020-08-03 PROCEDURE — 1036F TOBACCO NON-USER: CPT | Performed by: NURSE PRACTITIONER

## 2020-08-03 PROCEDURE — 3725F SCREEN DEPRESSION PERFORMED: CPT | Performed by: NURSE PRACTITIONER

## 2020-08-03 RX ORDER — DOXYCYCLINE HYCLATE 100 MG/1
100 CAPSULE ORAL EVERY 12 HOURS SCHEDULED
Qty: 20 CAPSULE | Refills: 0 | Status: SHIPPED | OUTPATIENT
Start: 2020-08-03 | End: 2020-08-13

## 2020-08-03 NOTE — PROGRESS NOTES
Virtual Regular Visit      Assessment/Plan:    Problem List Items Addressed This Visit           Visit Diagnoses     Rash    -  Primary    Relevant Medications    doxycycline hyclate (VIBRAMYCIN) 100 mg capsule           Advised to use hydrocortisone cream on the area  Symptoms of lyme disease discussed and advised to follow up if develops any  Take medication with food  It is important that you take the entire course of antibiotics prescribed  May also take a probiotic of your choice to maintain healthy GI mikki  Can take some probiotic and yogurt with the medication  Supportive care discussed and advised  Advised to RTO for any worsening and no improvement  Follow up for no improvement and worsening of conditions  Patient advised and educated when to see immediate medical care  Reason for visit is   Chief Complaint   Patient presents with    Virtual Regular Visit        Encounter provider [facility]    Provider located at [address]  [place] [address] [address]      Recent Visits  No visits were found meeting these conditions  Showing recent visits within past 7 days and meeting all other requirements     Today's Visits  Date Type Provider Dept   08/03/20 Telemedicine [facility] today's visits and meeting all other requirements     Future Appointments  No visits were found meeting these conditions  Showing future appointments within next 150 days and meeting all other requirements        The patient was identified by name and date of birth  Balalessandro Gruberks was informed that this is a telemedicine visit and that the visit is being conducted through [address] [place] [address] and patient was informed that this is not a secure, HIPAA-complaint platform  She agrees to proceed     My office door was closed  No one else was in the room    She acknowledged consent and understanding of privacy and security of the video platform  The patient has agreed to participate and understands they can discontinue the visit at any time  Patient is aware this is a billable service  Subjective  Erica Marrufo is a 64 y o  female   HPI   Patient stated that yesterday noticed bump on her abdominal area which is red and raised but not itchy  Stated that did not visualize any tick  Denies fever, chills, arthralgias, headache  Not sure how did it happen  Not taking any OTC for symptoms  Past Medical History:   Diagnosis Date    Basal cell carcinoma (BCC) of chest wall     Medical history non-contributory        Past Surgical History:   Procedure Laterality Date     SECTION      transverse x1    COLONOSCOPY N/A 2017    Procedure: COLONOSCOPY;  Surgeon: Guido Killian MD;  Location: [facility]; Service: Gastroenterology       Current Outpatient Medications   Medication Sig Dispense Refill    Biotin 1 MG CAPS Take by mouth daily with breakfast      doxycycline hyclate (VIBRAMYCIN) 100 mg capsule Take 1 capsule (100 mg total) by mouth every 12 (twelve) hours for 10 days 20 capsule 0    ibuprofen (MOTRIN) 200 mg tablet Take by mouth every 6 (six) hours as needed for mild pain      Multiple Vitamins-Minerals (MULTIVITAMIN PO) Take by mouth      Naproxen Sodium (ALEVE PO) Take by mouth      PREMARIN vaginal cream   2    zolpidem (AMBIEN) 5 mg tablet Take 1 tablet (5 mg total) by mouth daily at bedtime as needed for sleep 15 tablet 0     No current facility-administered medications for this visit  Allergies   Allergen Reactions    Other Other (See Comments)     Adhesive tape    Latex Rash       Review of Systems   Constitutional: Negative  Respiratory: Negative  Cardiovascular: Negative  Gastrointestinal: Negative  Musculoskeletal: Negative  Skin: Positive for rash  As noted in HPI     Neurological: Negative  Psychiatric/Behavioral: Negative          Video Exam    There were no vitals filed for this visit  Physical Exam   Constitutional: She is oriented to person, place, and time  She appears well-developed  HENT:   Nose: Nose normal    Eyes: Conjunctivae are normal    Neck: Normal range of motion  Pulmonary/Chest: Effort normal    Neurological: She is alert and oriented to person, place, and time  Skin:        No diaphoresis noted on video call   Psychiatric: Her behavior is normal  Judgment and thought content normal         I spent 10 minutes directly with the patient during this visit      Don Francis acknowledges that she has consented to an online visit or consultation  She understands that the online visit is based solely on information provided by her, and that, in the absence of a face-to-face physical evaluation by the physician, the diagnosis she receives is both limited and provisional in terms of accuracy and completeness  This is not intended to replace a full medical face-to-face evaluation by the physician  Nuvia Serrano understands and accepts these terms

## 2022-02-08 ENCOUNTER — TELEPHONE (OUTPATIENT)
Dept: FAMILY MEDICINE CLINIC | Facility: CLINIC | Age: 62
End: 2022-02-08

## 2022-05-25 NOTE — TELEPHONE ENCOUNTER
05/25/22 9:30 AM        Thank you for your request  Your request has been received, reviewed, and the patient chart updated  The PCP has successfully been removed with a patient attribution note  This message will now be completed          Thank you  Maurice Miller

## (undated) DEVICE — "MH-443 SUCTION VALVE F/EVIS140 EVIS160": Brand: SUCTION VALVE

## (undated) DEVICE — LUBRICANT SURGILUBE TUBE 4 OZ  FLIP TOP

## (undated) DEVICE — TUBING BUBBLE CLEAR 5MM X 100 FT NS

## (undated) DEVICE — BRUSH ENDO CLEANING DBL-HEADER

## (undated) DEVICE — "MAJ-901 WATER CONTAINER SET CV-160/140": Brand: WATER CONTAINER

## (undated) DEVICE — 1200CC GUARDIAN II: Brand: GUARDIAN

## (undated) DEVICE — "MH-438 A/W VLVE F/140 EVIS-140": Brand: AIR/WATER VALVE

## (undated) DEVICE — DISPOSABLE BIOPSY VALVE MAJ-1555: Brand: SINGLE USE BIOPSY VALVE (STERILE)

## (undated) DEVICE — AIRLIFE™  ADULT CUSHION NASAL CANNULA WITH 7 FOOT (2.1 M) CRUSH-RESISTANT OXYGEN TUBING, AND U/CONNECT-IT ADAPTER: Brand: AIRLIFE™

## (undated) DEVICE — MEDI-VAC YANKAUER SUCTION HANDLE: Brand: CARDINAL HEALTH

## (undated) DEVICE — SOLIDIFIER FLUID WASTE CONTROL 1500ML

## (undated) DEVICE — TUBING AUX CHANNEL

## (undated) DEVICE — GAUZE SPONGES,16 PLY: Brand: CURITY

## (undated) DEVICE — GLOVE EXAM NON-STRL NTRL PLUS LRG PF